# Patient Record
(demographics unavailable — no encounter records)

---

## 2024-10-17 NOTE — HISTORY OF PRESENT ILLNESS
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  She still gets occasional lip numbness, and since surgery has occasional sciatica. She also continues to c/o chronic constipation ( drinks senna tea) and GERD, for which she takes occasional tums.  She is taking HU 1000 mg QD, SCD is well-controlled ophtho YUTD-3/24/24  gen: thin female in female in nad vs: O2 sat 99% left forehead- healed  mild icterus lungs- cta cor: rrr-m ext; -c/c/e, no ulcers  3/24 ophtho UTD labs: 10/15/24 HGB 10.4 ANC 1.68 fetal HGB 5.7 vit d 23.0  A/P- 40 yo female with HGB SC, s/p neurosurgery 1/4/24 1. GErD- start pantoprazole- continue for 2 months- if sx are not completely resolved will refer to GI senna qhs for constipation 2. SCD- continue HU 1000 mg QD 3. f/u for routine appt in 2 months 4. patient to look into psychotherapy for anxiety 5. refuses flu shot 6. vit d def- [id number] iu q week SHADE Blank MD

## 2024-10-17 NOTE — HISTORY OF PRESENT ILLNESS
[de-identified] : The patient is a 39 yo female with HGB SC disease here s/p open craniotomy for aneurism clipping on 1/4/24.  She still gets occasional lip numbness, and since surgery has occasional sciatica. She also continues to c/o chronic constipation ( drinks senna tea) and GERD, for which she takes occasional tums.  She is taking HU 1000 mg QD, SCD is well-controlled ophtho YUTD-3/24/24  gen: thin female in female in nad vs: O2 sat 99% left forehead- healed  mild icterus lungs- cta cor: rrr-m ext; -c/c/e, no ulcers  3/24 ophtho UTD labs: 10/15/24 HGB 10.4 ANC 1.68 fetal HGB 5.7 vit d 23.0  A/P- 42 yo female with HGB SC, s/p neurosurgery 1/4/24 1. GErD- start pantoprazole- continue for 2 months- if sx are not completely resolved will refer to GI senna qhs for constipation 2. SCD- continue HU 1000 mg QD 3. f/u for routine appt in 2 months 4. patient to look into psychotherapy for anxiety 5. refuses flu shot 6. vit d def- [id number] iu q week SHADE Blank MD

## 2024-12-05 NOTE — PHYSICAL EXAM
[Appropriately responsive] : appropriately responsive [Alert] : alert [No Acute Distress] : no acute distress [Oriented x3] : oriented x3 [Labia Majora] : normal [Labia Minora] : normal [Uterine Adnexae] : normal [Soft] : soft [Non-tender] : non-tender [Non-distended] : non-distended [No HSM] : No HSM [No Lesions] : no lesions [No Mass] : no mass [Examination Of The Breasts] : a normal appearance [Normal] : normal [No Masses] : no breast masses were palpable

## 2024-12-08 NOTE — HISTORY OF PRESENT ILLNESS
[FreeTextEntry1] : 41 year old  female presents for prolonged vaginal bleeding. Last seen for POA in 2023 after hysteroscopy and polypectomy. Had negative pap/HPV in 2023. C/o prolonged menses starting  where she had typical flow for 3 days, then spotting for additional week. Also c/o left nipple itching. Reports bothersome neurological sx since last year. 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. Continues to have memory issues and numbness on left side of head. Pt has not been for mammo since 2023. Pt has sickle cell disease. H/o 1 prior c/s.  OBHx: c/s (male, , 35 weeks during sickle cell crisis)  PMHx: sickle-cell disease (SC) PSHx: c/s (), right hip replacement  FHx: DM-Arvin, pancreatic cancer-Arvin  Allergies: NKDA

## 2024-12-08 NOTE — END OF VISIT
[FreeTextEntry3] :  I, Ludy Thomson, acted as a scribe on behalf of Dr. Mercedes Diaz M.D. on 12/05/2024.   All medical entries made by the scribe were at my, Dr. Mercedes Diaz M.D., direction and personally dictated by me on 12/05/2024. I have reviewed the chart and agree that the record accurately reflects my personal performance of the history, physical exam, assessment and plan. I have also personally directed, reviewed, and agreed with the chart.

## 2024-12-08 NOTE — HISTORY OF PRESENT ILLNESS
[FreeTextEntry1] : 41 year old  female presents for prolonged vaginal bleeding. Last seen for POA in 2023 after hysteroscopy and polypectomy. Had negative pap/HPV in 2023. C/o prolonged menses starting  where she had typical flow for 3 days, then spotting for additional week. Also c/o left nipple itching. Reports bothersome neurological sx since last year. 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. Continues to have memory issues and numbness on left side of head. Pt has not been for mammo since 2023. Pt has sickle cell disease. H/o 1 prior c/s.  OBHx: c/s (male, , 35 weeks during sickle cell crisis)  PMHx: sickle-cell disease (SC) PSHx: c/s (), right hip replacement  FHx: DM-Arvin, pancreatic cancer-Arvin  Allergies: NKDA

## 2024-12-08 NOTE — REASON FOR VISIT
[Follow-Up] : a follow-up evaluation of [Abnormal Uterine Bleeding] : abnormal uterine bleeding [Annual] : an annual visit.

## 2024-12-08 NOTE — PLAN
[FreeTextEntry1] : 41 year old  female presents for prolonged vaginal bleeding and routine care.   -Pap/HPV -Referred for mammo/sono -Consider pelvic imaging if abnormal bleeding occurs.   F/u if abnormal bleeding persists or for annual exam.

## 2024-12-19 NOTE — HISTORY OF PRESENT ILLNESS
[de-identified] : The patient is a 40 yo female with HGB SC disease here s/p open craniotomy for aneurism clipping on 1/4/24.  She also continues to c/o chronic constipation ( drinks senna tea) and GERD. She did not reliably take the pantaprozole that was given at last visit.   neurology- given atomomoxetine for ADHD and anxiety, but has not taken it yet. The anxiety is severe, and making her less effective in her daily life She is taking HU 1000 mg QD, SCD is well-controlled ophtho-UTD-3/24/24 Mammo/pap UTD c/o severe anxiety,   gen: thin female in female in nad vs: O2 sat 99% left forehead- healed  mild icterus lungs- cta cor: rrr-m ext; -c/c/e, no ulcers  3/24 ophtho UTD labs: 10/15/24 HGB 10.4 ANC 1.68 fetal HGB 5.7 vit d 23.0  A/P- 40 yo female with HGB SC, s/p neurosurgery 1/4/24 1. GERD- start pantoprazole- refer to GI Did not reliably take the pantoprazole, still having symptoms senna tea qhs for constipation 2. SCD- continue HU 1000 mg QD 3. f/u for routine appt in 2 months 4. patient to actually start the atomomoxetine that she was prescribed 5. vit d def- [id number] iu q week SHADE Blank MD

## 2024-12-19 NOTE — HISTORY OF PRESENT ILLNESS
[de-identified] : The patient is a 42 yo female with HGB SC disease here s/p open craniotomy for aneurism clipping on 1/4/24.  She also continues to c/o chronic constipation ( drinks senna tea) and GERD. She did not reliably take the pantaprozole that was given at last visit.   neurology- given atomomoxetine for ADHD and anxiety, but has not taken it yet. The anxiety is severe, and making her less effective in her daily life She is taking HU 1000 mg QD, SCD is well-controlled ophtho-UTD-3/24/24 Mammo/pap UTD c/o severe anxiety,   gen: thin female in female in nad vs: O2 sat 99% left forehead- healed  mild icterus lungs- cta cor: rrr-m ext; -c/c/e, no ulcers  3/24 ophtho UTD labs: 10/15/24 HGB 10.4 ANC 1.68 fetal HGB 5.7 vit d 23.0  A/P- 42 yo female with HGB SC, s/p neurosurgery 1/4/24 1. GERD- start pantoprazole- refer to GI Did not reliably take the pantoprazole, still having symptoms senna tea qhs for constipation 2. SCD- continue HU 1000 mg QD 3. f/u for routine appt in 2 months 4. patient to actually start the atomomoxetine that she was prescribed 5. vit d def- [id number] iu q week SHADE Blank MD

## 2025-03-20 NOTE — HISTORY OF PRESENT ILLNESS
[de-identified] : The patient is a 42 yo female with HGB SC disease here s/p open craniotomy for aneurism clipping on 1/4/24.  The patient is c/o worsening HAs, last night was the worst.  Had 2 HAs last week, pain ranges yesterday's HA was 5/10, significant enough that she could not sleep or read.  She has f/u scheduled with neurosurgery, will probably need a new MRA prior to her visit.  She also continues to c/o chronic constipation ( drinks senna tea) and GERD. She has been taking the pantoprazole daily, and most of her GERD has resolved. She says that the senna tea no longer helps her. She has started to take psyllium, but the preparation is difficult for her to take. Her diet is heavy in rice and bananas.  neurology- given atomomoxetine for ADHD and anxiety. She took it a few times, but never long enough to achieve an effect. The anxiety is severe, and making her less effective in her daily life. She sees a therapist, who has encouraged daily meditation.  She is taking HU 1000 mg QD, SCD is well-controlled ophtho-UTD-3/24/24 Mammo/pap UTD  gen: thin female in female in nad vs: O2 sat 99% left forehead- healed  mild icterus lungs- cta cor: rrr-m ext; -c/c/e, no ulcers neuro A and O x3 motor-5/5 upper and lower CB intact sensory intact gait- WNL ophtho UTD  labs: 3/17/25 HGB 10.4 ANC 1.68 fetal HGB 5.7 vit d 23.0  A/P- 42 yo female with HGB SC, s/p neurosurgery 1/4/24 1. GERD/IBS- continue pantoprazole- refer to GI Advise senna tabs 2 tabs QHS, colace 100 mg tid, mirilax as needed D/C rice and bananas patient wishes to f/u with GI 2. SCD- well controlled-continue HU 1000 mg QD 3. f/u for routine appt in 2 months 4. patient encouraged to start the atomomoxetine that she was prescribed continue therapy start meditation or gentle yoga 5. vit d def- [id number] iu q week 6. routine labs 7. recurrent HAs refer back to neurosurgery MRA head SHADE Blank MD

## 2025-03-20 NOTE — HISTORY OF PRESENT ILLNESS
[de-identified] : The patient is a 40 yo female with HGB SC disease here s/p open craniotomy for aneurism clipping on 1/4/24.  The patient is c/o worsening HAs, last night was the worst.  Had 2 HAs last week, pain ranges yesterday's HA was 5/10, significant enough that she could not sleep or read.  She has f/u scheduled with neurosurgery, will probably need a new MRA prior to her visit.  She also continues to c/o chronic constipation ( drinks senna tea) and GERD. She has been taking the pantoprazole daily, and most of her GERD has resolved. She says that the senna tea no longer helps her. She has started to take psyllium, but the preparation is difficult for her to take. Her diet is heavy in rice and bananas.  neurology- given atomomoxetine for ADHD and anxiety. She took it a few times, but never long enough to achieve an effect. The anxiety is severe, and making her less effective in her daily life. She sees a therapist, who has encouraged daily meditation.  She is taking HU 1000 mg QD, SCD is well-controlled ophtho-UTD-3/24/24 Mammo/pap UTD  gen: thin female in female in nad vs: O2 sat 99% left forehead- healed  mild icterus lungs- cta cor: rrr-m ext; -c/c/e, no ulcers neuro A and O x3 motor-5/5 upper and lower CB intact sensory intact gait- WNL ophtho UTD  labs: 3/17/25 HGB 10.4 ANC 1.68 fetal HGB 5.7 vit d 23.0  A/P- 40 yo female with HGB SC, s/p neurosurgery 1/4/24 1. GERD/IBS- continue pantoprazole- refer to GI Advise senna tabs 2 tabs QHS, colace 100 mg tid, mirilax as needed D/C rice and bananas patient wishes to f/u with GI 2. SCD- well controlled-continue HU 1000 mg QD 3. f/u for routine appt in 2 months 4. patient encouraged to start the atomomoxetine that she was prescribed continue therapy start meditation or gentle yoga 5. vit d def- [id number] iu q week 6. routine labs 7. recurrent HAs refer back to neurosurgery MRA head SHADE Blank MD

## 2025-06-08 NOTE — HISTORY OF PRESENT ILLNESS
[de-identified] : The patient is a 42 yo female with HGB SC disease here s/p open craniotomy for aneurism clipping on 1/4/24.  The patient has not had any recent SCD pain. She is taking HU 1000 mg QD. She also continues to c/o chronic constipation and abdominal pain. She is following with GI for this,is being treated with trulance 5 mg QD and voquezna 20 mg QD. These medications are not helping her pain. She is very anxious, has started speaking with a therapist as well as doing yoga 2x per week. ophtho-UTD-3/24/24 Mammo/pap UTD  gen: thin female in female in nad vs: O2 sat 99% left forehead- healed  mild icterus lungs- cta cor: rrr-m ext; -c/c/e, no ulcers neuro A and O x3 motor-5/5 upper and lower CB intact sensory intact gait- WNL  labs: 6/6/25 HGB 10.1 ANC 2.82 fetal HGB 5.7 vit d 51.3 UA (done when she was menstruating), + blood, neg for protein ferritin 56  A/P- 42 yo female with HGB SC, s/p neurosurgery 1/4/24 1. GERD/IBS- continue reginen as per GI Patient has never tried cutting out dairy completely, suggested she try it 2. SCD- well controlled-continue HU 1000 mg QD 3. f/u for routine appt in 2-3 months with new pcp/heme 4. anxiety- continue therapy, increase yoga to 3x per week 5. vit d def- decrease supp to 5000 mg QD 6. routine labs  SHADE Blank MD

## 2025-06-08 NOTE — HISTORY OF PRESENT ILLNESS
[de-identified] : The patient is a 40 yo female with HGB SC disease here s/p open craniotomy for aneurism clipping on 1/4/24.  The patient has not had any recent SCD pain. She is taking HU 1000 mg QD. She also continues to c/o chronic constipation and abdominal pain. She is following with GI for this,is being treated with trulance 5 mg QD and voquezna 20 mg QD. These medications are not helping her pain. She is very anxious, has started speaking with a therapist as well as doing yoga 2x per week. ophtho-UTD-3/24/24 Mammo/pap UTD  gen: thin female in female in nad vs: O2 sat 99% left forehead- healed  mild icterus lungs- cta cor: rrr-m ext; -c/c/e, no ulcers neuro A and O x3 motor-5/5 upper and lower CB intact sensory intact gait- WNL  labs: 6/6/25 HGB 10.1 ANC 2.82 fetal HGB 5.7 vit d 51.3 UA (done when she was menstruating), + blood, neg for protein ferritin 56  A/P- 40 yo female with HGB SC, s/p neurosurgery 1/4/24 1. GERD/IBS- continue reginen as per GI Patient has never tried cutting out dairy completely, suggested she try it 2. SCD- well controlled-continue HU 1000 mg QD 3. f/u for routine appt in 2-3 months with new pcp/heme 4. anxiety- continue therapy, increase yoga to 3x per week 5. vit d def- decrease supp to 5000 mg QD 6. routine labs  SHADE Blank MD